# Patient Record
Sex: MALE | Race: BLACK OR AFRICAN AMERICAN | NOT HISPANIC OR LATINO | Employment: OTHER | URBAN - METROPOLITAN AREA
[De-identification: names, ages, dates, MRNs, and addresses within clinical notes are randomized per-mention and may not be internally consistent; named-entity substitution may affect disease eponyms.]

---

## 2018-02-07 ENCOUNTER — OFFICE VISIT (OUTPATIENT)
Dept: URGENT CARE | Facility: CLINIC | Age: 83
End: 2018-02-07
Payer: COMMERCIAL

## 2018-02-07 VITALS
TEMPERATURE: 97 F | SYSTOLIC BLOOD PRESSURE: 139 MMHG | RESPIRATION RATE: 18 BRPM | BODY MASS INDEX: 25.62 KG/M2 | DIASTOLIC BLOOD PRESSURE: 71 MMHG | WEIGHT: 179 LBS | HEART RATE: 106 BPM | OXYGEN SATURATION: 96 % | HEIGHT: 70 IN

## 2018-02-07 DIAGNOSIS — J01.10 ACUTE NON-RECURRENT FRONTAL SINUSITIS: ICD-10-CM

## 2018-02-07 DIAGNOSIS — Z79.4 CONTROLLED TYPE 2 DIABETES MELLITUS WITHOUT COMPLICATION, WITH LONG-TERM CURRENT USE OF INSULIN: ICD-10-CM

## 2018-02-07 DIAGNOSIS — R05.9 COUGH: Primary | ICD-10-CM

## 2018-02-07 DIAGNOSIS — E11.9 CONTROLLED TYPE 2 DIABETES MELLITUS WITHOUT COMPLICATION, WITH LONG-TERM CURRENT USE OF INSULIN: ICD-10-CM

## 2018-02-07 PROCEDURE — 99203 OFFICE O/P NEW LOW 30 MIN: CPT | Mod: S$GLB,,, | Performed by: NURSE PRACTITIONER

## 2018-02-07 PROCEDURE — 1159F MED LIST DOCD IN RCRD: CPT | Mod: S$GLB,,, | Performed by: NURSE PRACTITIONER

## 2018-02-07 RX ORDER — HYDROCHLOROTHIAZIDE 25 MG/1
25 TABLET ORAL DAILY
COMMUNITY
End: 2023-03-08

## 2018-02-07 RX ORDER — PREDNISONE 5 MG/1
5 TABLET ORAL DAILY
Qty: 5 TABLET | Refills: 0 | Status: SHIPPED | OUTPATIENT
Start: 2018-02-07 | End: 2018-02-12

## 2018-02-07 RX ORDER — METFORMIN HYDROCHLORIDE 500 MG/1
500 TABLET ORAL 2 TIMES DAILY WITH MEALS
COMMUNITY
End: 2023-03-08

## 2018-02-07 RX ORDER — DOXYCYCLINE 100 MG/1
100 CAPSULE ORAL 2 TIMES DAILY
Qty: 14 CAPSULE | Refills: 0 | Status: SHIPPED | OUTPATIENT
Start: 2018-02-07 | End: 2018-02-17

## 2018-02-07 RX ORDER — ASPIRIN 81 MG/1
81 TABLET ORAL DAILY
COMMUNITY

## 2018-02-07 RX ORDER — LISINOPRIL 40 MG/1
40 TABLET ORAL DAILY
COMMUNITY
End: 2023-03-08

## 2018-02-07 RX ORDER — AMLODIPINE BESYLATE 5 MG/1
5 TABLET ORAL DAILY
COMMUNITY
End: 2023-03-08

## 2018-02-07 RX ORDER — SIMVASTATIN 10 MG/1
10 TABLET, FILM COATED ORAL NIGHTLY
COMMUNITY
End: 2023-03-08

## 2018-02-07 RX ORDER — INSULIN ASPART 100 [IU]/ML
INJECTION, SOLUTION INTRAVENOUS; SUBCUTANEOUS
COMMUNITY

## 2018-02-07 NOTE — PROGRESS NOTES
"Subjective:       Patient ID: Rick Owens is a 85 y.o. male.    Vitals:  height is 5' 10" (1.778 m) and weight is 81.2 kg (179 lb). His oral temperature is 97.1 °F (36.2 °C). His blood pressure is 139/71 and his pulse is 106. His respiration is 18 and oxygen saturation is 96%.     Chief Complaint: Cough    This is a 85 y.o. male with No past medical history on file.   who presents today with a chief complaint of cough.        Cough   This is a new problem. The current episode started in the past 7 days. The problem has been gradually worsening. The cough is productive of sputum and productive of bloody sputum. Associated symptoms include nasal congestion and postnasal drip. Pertinent negatives include no chest pain, chills, ear pain, eye redness, fever, headaches, myalgias, sore throat, shortness of breath or wheezing. He has tried OTC cough suppressant for the symptoms. The treatment provided no relief.     Review of Systems   Constitution: Negative for chills, fever and malaise/fatigue.   HENT: Positive for congestion and postnasal drip. Negative for ear pain, hoarse voice and sore throat.    Eyes: Negative for discharge and redness.   Cardiovascular: Negative for chest pain, dyspnea on exertion and leg swelling.   Respiratory: Positive for cough. Negative for shortness of breath, sputum production and wheezing.    Musculoskeletal: Negative for myalgias.   Gastrointestinal: Negative for abdominal pain and nausea.   Neurological: Negative for headaches.       Objective:      Physical Exam   Constitutional: He is oriented to person, place, and time. He appears well-developed and well-nourished.   HENT:   Head: Normocephalic and atraumatic.   Nose: Nose normal.   Mouth/Throat: Oropharyngeal exudate present.   Bilateral fluid  Small blood clot noted to right nares   Cardiovascular: Normal rate, regular rhythm and normal heart sounds.    Pulmonary/Chest: Effort normal and breath sounds normal.   Abdominal: Soft. " Bowel sounds are normal.   Musculoskeletal: He exhibits no edema.   Neurological: He is alert and oriented to person, place, and time.   Skin: Skin is warm and dry. No pallor.   Psychiatric: He has a normal mood and affect. His behavior is normal. Judgment and thought content normal.   Nursing note and vitals reviewed.      Assessment:       1. Cough    2. Acute non-recurrent frontal sinusitis    3. Controlled type 2 diabetes mellitus without complication, with long-term current use of insulin        Plan:         1. Cough  Has diabetic tussin  - X-Ray Chest PA And Lateral; Future    2. Acute non-recurrent frontal sinusitis  Seems that little blood he blew/spit out is coming from sinuses. CXR is clear.   - predniSONE (DELTASONE) 5 MG tablet; Take 1 tablet (5 mg total) by mouth once daily.  Dispense: 5 tablet; Refill: 0  - doxycycline (MONODOX) 100 MG capsule; Take 1 capsule (100 mg total) by mouth 2 (two) times daily.  Dispense: 14 capsule; Refill: 0    3. Controlled type 2 diabetes mellitus without complication, with long-term current use of insulin  A1C 7

## 2018-02-07 NOTE — PATIENT INSTRUCTIONS
Preventing Sinusitis    Colds, flu, and allergies make it more likely for you to get sinusitis. Do your best to prevent sinusitis by preventing these problems. Do what you can to avoid getting colds and other infections. Stay away from things that cause allergies (allergens). Keep your sinuses as moist as you can.  Tips for air travel  When traveling on an airplane, use saline nasal spray to keep your sinuses moist. Drink plenty of fluids. You may also want to take a decongestant before you get on the plane.   Prevent colds  Do what you can to avoid being exposed to colds and flu. When possible, take more time to rest when you feel something coming on.  · Wash your hands often. This is especially important during cold and flu season. Try not to touch your face.  · As much as possible, stay away from infected people.  · Follow these standbys for staying healthy: Eat balanced meals, exercise regularly, and get plenty of sleep.  Stay away from allergens  First find out what things youre allergic to. Then take steps to stay away from allergens or irritants in the air such as dust, pollution, and pollen.  · Wear a mask when you clean. Or consider hiring a  to help you stay away from dust.  · Sit in the nonsmoking sections of restaurants.  · Don't go outdoors during peak pollution hours such as rush hour.  · Keep an air conditioner on during allergy season. Clean its filter regularly.  · Ask your healthcare provider about a referral to have an allergy evaluation. Or ask for a referral to see an allergy specialist.  Boost moisture  Keeping your sinuses moist makes your mucus thinner. This allows your sinuses to drain better. And this helps prevent infection. Ask your doctor about these suggestions:  · Use a humidifier. Clean it often to remove any mold or mildew.  · Drink several glasses of water a day.  · Stay away from drying beverages such as alcohol and coffee.  · Stay away from all types of smoke,  which dries out sinus linings. This includes tobacco smoke and chemical smoke in workplace settings.  · Use saltwater rinses.  Date Last Reviewed: 10/1/2016  © 8585-4897 [employer]. [address] [place] [address], [place] [address]. All rights reserved. This information is not intended as a substitute for professional medical care. Always follow your healthcare professional's instructions.

## 2018-02-07 NOTE — PROGRESS NOTES
Subjective:       Patient ID: Rick Owens is a 85 y.o. male.    Chief Complaint: Cough    HPI  Review of Systems    Objective:      Physical Exam    Assessment:       1. Cough        Plan:   1. Cough  ***  - X-Ray Chest PA And Lateral; Future

## 2018-02-10 ENCOUNTER — TELEPHONE (OUTPATIENT)
Dept: URGENT CARE | Facility: CLINIC | Age: 83
End: 2018-02-10

## 2020-03-15 ENCOUNTER — OFFICE VISIT (OUTPATIENT)
Dept: URGENT CARE | Facility: CLINIC | Age: 85
End: 2020-03-15
Payer: COMMERCIAL

## 2020-03-15 VITALS
HEIGHT: 70 IN | SYSTOLIC BLOOD PRESSURE: 140 MMHG | DIASTOLIC BLOOD PRESSURE: 72 MMHG | RESPIRATION RATE: 16 BRPM | HEART RATE: 83 BPM | BODY MASS INDEX: 25.77 KG/M2 | OXYGEN SATURATION: 97 % | TEMPERATURE: 98 F | WEIGHT: 180 LBS

## 2020-03-15 DIAGNOSIS — H11.31 SUBCONJUNCTIVAL HEMORRHAGE OF RIGHT EYE: Primary | ICD-10-CM

## 2020-03-15 PROCEDURE — 99214 PR OFFICE/OUTPT VISIT, EST, LEVL IV, 30-39 MIN: ICD-10-PCS | Mod: S$GLB,,, | Performed by: PHYSICIAN ASSISTANT

## 2020-03-15 PROCEDURE — 99214 OFFICE O/P EST MOD 30 MIN: CPT | Mod: S$GLB,,, | Performed by: PHYSICIAN ASSISTANT

## 2020-03-15 NOTE — PATIENT INSTRUCTIONS
Subconjunctival Hemorrhage    A subconjunctival hemorrhage is when a blood vessel breaks open in the white of the eye. It causes a bright red patch in the white of the eye. It is similar to a bruise on the skin. This type of hemorrhage is common. It can look quite alarming, but it is usually harmless.  Understanding the conjunctiva  The conjunctiva is the thin layer that covers the inside of the eyelids and the surface of the eye. It has many tiny blood vessels that bring oxygen and nutrients to the eye. The sclera is the white part of the eye that lies beneath the conjunctiva. Sometimes a blood vessel in the conjunctiva breaks open and bleeds. The blood then collects under the conjunctiva and turns part of the eye red. Over several weeks, your body then absorbs the blood.  What causes subconjunctival hemorrhage?  In many cases the cause isnt known. But some health conditions may make it more likely. These include:  · Eye injury  · Eye surgery  · High blood pressure  · Inflammation of the conjunctiva  · Contact lens use  · Diabetes  · Arteriosclerosis  · Tumor of the conjunctiva  · Diseases that affect blood clotting  · Violent sneezing, coughing, or vomiting  · Certain medicines that can increase bleeding, such as aspirin  · Pushing hard during childbirth  · Straining during constipation  Symptoms of subconjunctival hemorrhage  The main symptom is a red patch on the eye. You may notice it after waking up in the morning. In most cases just one eye will have a hemorrhage. It usually happens once and then goes away. But some health conditions may cause repeat hemorrhages. You may feel like you have something in your eye, but this is not common. The hemorrhage shouldnt affect your eyesight or cause any pain. If you do have pain, you may have another type of problem with your eye.  Diagnosing subconjunctival hemorrhage  Your healthcare provider will ask about your health history. You may have a physical exam. This  includes a basic eye exam. Your provider will make sure you dont have other causes of red eye that may need other treatment.  You will need to see an eye doctor (ophthalmologist) if you have had an eye injury. This doctor might use a special lighted microscope to look closely at your eye. This helps show the doctor if the injury hurt the eye itself and not just its outer layer.  If this is not your first subconjunctival hemorrhage, your doctor may need to find the cause. For example, you may need blood tests to check for a blood clotting disorder.  Treatment for subconjunctival hemorrhage  In most cases you will not need treatment. The red patch will usually go away on its own in a few weeks. It will turn from red to brown and then yellow. There are no treatments to speed up this process. Your doctor may suggest you use a warm compress and artificial tears eye drops to help relieve some of the redness.  If your subconjunctival hemorrhage was caused by a health condition, that condition will be treated. For example, you may need a blood pressure medicine to treat high blood pressure.  When to call your healthcare provider  Call your healthcare provider right away if you have any of these:  · Hemorrhage that doesnt go away in 2 to 3 weeks  · Eye pain  · Loss of eyesight  · Another subconjunctival hemorrhage    Date Last Reviewed: 2/1/2017  © 7513-0608 [employer]. [address] [place] [address], [place] [address]. All rights reserved. This information is not intended as a substitute for professional medical care. Always follow your healthcare professional's instructions.

## 2020-03-15 NOTE — PROGRESS NOTES
"Subjective:       Patient ID: Rick Owens is a 87 y.o. male.    Vitals:  height is 5' 10" (1.778 m) and weight is 81.6 kg (180 lb). His tympanic temperature is 98 °F (36.7 °C). His blood pressure is 140/72 (abnormal) and his pulse is 83. His respiration is 16 and oxygen saturation is 97%.     Chief Complaint: Eye Problem (Right (blood vessel))    Eye Problem    The right eye is affected. This is a new (blood vessel popped right eye. x1 day. H/o hypertension ) problem. The current episode started yesterday. The problem occurs constantly. There was no injury mechanism. The pain is at a severity of 0/10. The patient is experiencing no pain. There is no known exposure to pink eye. He does not wear contacts. Associated symptoms include eye redness. Pertinent negatives include no blurred vision, eye discharge, double vision, fever, itching, nausea, photophobia or vomiting. The treatment provided no relief.       Constitution: Negative for chills and fever.   HENT: Negative for congestion and sinus pain.    Eyes: Positive for eye redness. Negative for eye trauma, foreign body in eye, eye discharge, eye itching, eye pain, photophobia, vision loss, double vision, blurred vision and eyelid swelling.   Gastrointestinal: Negative for nausea and vomiting.   Skin: Negative for rash.   Allergic/Immunologic: Negative for seasonal allergies and itching.   Neurological: Negative for headaches.       Objective:      Physical Exam   Constitutional: He is oriented to person, place, and time. He appears well-developed and well-nourished. He is cooperative.  Non-toxic appearance. He does not have a sickly appearance. He does not appear ill. No distress.   HENT:   Head: Normocephalic and atraumatic.   Right Ear: Hearing, tympanic membrane, external ear and ear canal normal.   Left Ear: Hearing, tympanic membrane, external ear and ear canal normal.   Nose: Nose normal. No mucosal edema, rhinorrhea or nasal deformity. No epistaxis. Right " sinus exhibits no maxillary sinus tenderness and no frontal sinus tenderness. Left sinus exhibits no maxillary sinus tenderness and no frontal sinus tenderness.   Mouth/Throat: Uvula is midline, oropharynx is clear and moist and mucous membranes are normal. No trismus in the jaw. Normal dentition. No uvula swelling. No oropharyngeal exudate, posterior oropharyngeal edema or posterior oropharyngeal erythema.   Eyes: Pupils are equal, round, and reactive to light. EOM and lids are normal. Right eye exhibits no hordeolum. No foreign body present in the right eye. Left eye exhibits no hordeolum. No foreign body present in the left eye. Right conjunctiva is not injected. Right conjunctiva has a hemorrhage. Left conjunctiva is not injected. Left conjunctiva has no hemorrhage. No scleral icterus. Right eye exhibits normal extraocular motion and no nystagmus. Left eye exhibits normal extraocular motion and no nystagmus. Right pupil is round and reactive. Left pupil is round and reactive. Pupils are equal.       20/40 right, 20/50 left, 20/40 both eyes   Neck: Trachea normal, full passive range of motion without pain and phonation normal. Neck supple. No neck rigidity. No edema and no erythema present.   Cardiovascular: Normal rate, regular rhythm, normal heart sounds, intact distal pulses and normal pulses.   Pulmonary/Chest: Effort normal and breath sounds normal. No respiratory distress. He has no decreased breath sounds. He has no rhonchi.   Abdominal: Normal appearance.   Musculoskeletal: Normal range of motion. He exhibits no edema or deformity.   Neurological: He is alert and oriented to person, place, and time. He exhibits normal muscle tone. Coordination normal.   Skin: Skin is warm, dry, intact, not diaphoretic and not pale.   Psychiatric: He has a normal mood and affect. His speech is normal and behavior is normal. Judgment and thought content normal. Cognition and memory are normal.   Nursing note and vitals  reviewed.        Assessment:       1. Subconjunctival hemorrhage of right eye        Plan:         Subconjunctival hemorrhage of right eye         Patient Instructions       Subconjunctival Hemorrhage    A subconjunctival hemorrhage is when a blood vessel breaks open in the white of the eye. It causes a bright red patch in the white of the eye. It is similar to a bruise on the skin. This type of hemorrhage is common. It can look quite alarming, but it is usually harmless.  Understanding the conjunctiva  The conjunctiva is the thin layer that covers the inside of the eyelids and the surface of the eye. It has many tiny blood vessels that bring oxygen and nutrients to the eye. The sclera is the white part of the eye that lies beneath the conjunctiva. Sometimes a blood vessel in the conjunctiva breaks open and bleeds. The blood then collects under the conjunctiva and turns part of the eye red. Over several weeks, your body then absorbs the blood.  What causes subconjunctival hemorrhage?  In many cases the cause isnt known. But some health conditions may make it more likely. These include:  · Eye injury  · Eye surgery  · High blood pressure  · Inflammation of the conjunctiva  · Contact lens use  · Diabetes  · Arteriosclerosis  · Tumor of the conjunctiva  · Diseases that affect blood clotting  · Violent sneezing, coughing, or vomiting  · Certain medicines that can increase bleeding, such as aspirin  · Pushing hard during childbirth  · Straining during constipation  Symptoms of subconjunctival hemorrhage  The main symptom is a red patch on the eye. You may notice it after waking up in the morning. In most cases just one eye will have a hemorrhage. It usually happens once and then goes away. But some health conditions may cause repeat hemorrhages. You may feel like you have something in your eye, but this is not common. The hemorrhage shouldnt affect your eyesight or cause any pain. If you do have pain, you may have  another type of problem with your eye.  Diagnosing subconjunctival hemorrhage  Your healthcare provider will ask about your health history. You may have a physical exam. This includes a basic eye exam. Your provider will make sure you dont have other causes of red eye that may need other treatment.  You will need to see an eye doctor (ophthalmologist) if you have had an eye injury. This doctor might use a special lighted microscope to look closely at your eye. This helps show the doctor if the injury hurt the eye itself and not just its outer layer.  If this is not your first subconjunctival hemorrhage, your doctor may need to find the cause. For example, you may need blood tests to check for a blood clotting disorder.  Treatment for subconjunctival hemorrhage  In most cases you will not need treatment. The red patch will usually go away on its own in a few weeks. It will turn from red to brown and then yellow. There are no treatments to speed up this process. Your doctor may suggest you use a warm compress and artificial tears eye drops to help relieve some of the redness.  If your subconjunctival hemorrhage was caused by a health condition, that condition will be treated. For example, you may need a blood pressure medicine to treat high blood pressure.  When to call your healthcare provider  Call your healthcare provider right away if you have any of these:  · Hemorrhage that doesnt go away in 2 to 3 weeks  · Eye pain  · Loss of eyesight  · Another subconjunctival hemorrhage    Date Last Reviewed: 2/1/2017  © 8276-4850 [employer]. [address] [place] [address], [place] [address]. All rights reserved. This information is not intended as a substitute for professional medical care. Always follow your healthcare professional's instructions.

## 2023-03-28 PROBLEM — I34.0 MITRAL VALVE REGURGITATION: Status: ACTIVE | Noted: 2023-03-28
